# Patient Record
Sex: FEMALE | Race: WHITE | NOT HISPANIC OR LATINO | URBAN - NONMETROPOLITAN AREA
[De-identification: names, ages, dates, MRNs, and addresses within clinical notes are randomized per-mention and may not be internally consistent; named-entity substitution may affect disease eponyms.]

---

## 2021-04-08 DIAGNOSIS — Z23 ENCOUNTER FOR IMMUNIZATION: ICD-10-CM

## 2024-05-20 ENCOUNTER — CONSULT (OUTPATIENT)
Dept: PAIN MEDICINE | Facility: CLINIC | Age: 74
End: 2024-05-20

## 2024-05-20 VITALS
TEMPERATURE: 97.5 F | WEIGHT: 160 LBS | SYSTOLIC BLOOD PRESSURE: 138 MMHG | HEART RATE: 70 BPM | BODY MASS INDEX: 25.71 KG/M2 | HEIGHT: 66 IN | DIASTOLIC BLOOD PRESSURE: 86 MMHG | OXYGEN SATURATION: 98 % | RESPIRATION RATE: 18 BRPM

## 2024-05-20 DIAGNOSIS — M47.812 CERVICAL SPONDYLOSIS: ICD-10-CM

## 2024-05-20 DIAGNOSIS — M54.12 CERVICAL RADICULOPATHY: Primary | ICD-10-CM

## 2024-05-20 PROCEDURE — 99204 OFFICE O/P NEW MOD 45 MIN: CPT | Performed by: ANESTHESIOLOGY

## 2024-05-20 PROCEDURE — G2211 COMPLEX E/M VISIT ADD ON: HCPCS | Performed by: ANESTHESIOLOGY

## 2024-05-20 RX ORDER — PREDNISONE 10 MG/1
10 TABLET ORAL DAILY
COMMUNITY
Start: 2024-03-22 | End: 2024-05-20

## 2024-05-20 RX ORDER — ACETAMINOPHEN 325 MG/1
650 TABLET ORAL EVERY 6 HOURS PRN
COMMUNITY

## 2024-05-20 RX ORDER — CAL/D3/MAG11/ZINC/COP/MANG/BOR 600 MG-800
TABLET ORAL
COMMUNITY
Start: 2011-11-09

## 2024-05-20 RX ORDER — PRAVASTATIN SODIUM 80 MG/1
1 TABLET ORAL EVERY EVENING
COMMUNITY
Start: 2013-12-04

## 2024-05-20 RX ORDER — DEXLANSOPRAZOLE 60 MG/1
60 CAPSULE, DELAYED RELEASE ORAL DAILY
COMMUNITY
Start: 2019-10-01

## 2024-05-20 RX ORDER — GABAPENTIN 300 MG/1
300 CAPSULE ORAL 3 TIMES DAILY
Qty: 90 CAPSULE | Refills: 2 | Status: SHIPPED | OUTPATIENT
Start: 2024-05-20

## 2024-05-20 RX ORDER — DIAZEPAM 5 MG/1
TABLET ORAL
COMMUNITY
Start: 2024-04-23 | End: 2024-05-20

## 2024-05-20 RX ORDER — IBUPROFEN 600 MG/1
600 TABLET ORAL 3 TIMES DAILY
COMMUNITY
Start: 2024-03-22 | End: 2025-03-22

## 2024-05-20 NOTE — PROGRESS NOTES
Assessment  1. Cervical radiculopathy  2. Cervical spondylosis    Left, greater than right sided cervical radicular pain in C6 and C7 dermatomal distribution accompanied by pain limited weakness numbness and paresthesias.  Patient has not yet been a full participant with PT. Chronic pain with decreased participation with IADLs over the past 3 months.  Has been taking NSAIDs and tylenol infrequently with modest benefit.  5/5 strength bilaterally in upper extremities with AROM, posittive spurling's maneuver,b/l.  Additionally there is positive cervical facet loading eliciting pain, left greater than right. Negative Davila's, denies any gait instability, saddle anesthesia. On MRI imaging Multilevel disc degeneration and cervical spondylosis, greatest at C3-4.  At C3-4, disc ridge complex with uncovertebral arthropathy. There is mildimpression of the ventral thecal sac with moderate bilateral neural foraminal stenosis.  No cord compression or cord signal abnormality.  Risks, benefits alternatives to epidural steroid injections thoroughly discussed with patient.  Handouts provided questions answered to patient's satisfaction.  Lifestyle modifications extensively discussed including sleep hygiene, neck posture, diet, exercise and weight loss in conjunction with PT.  Will proceed with multimodal pain therapy plan as noted below:    Plan  -C6-C7 ILESI; f/u 2 weeks post procedure  -gabapentin 300 mg t.i.d. Ordered for patient; counseled regarding sedative effects of taking this medication and provided up titration calendar.  Counseled not to take medication while driving or operating heavy machinery/using stairs  -script provided for formal physical therapy for right-sided lumbar radiculopathy; Physician directed home exercise plan as per [employer] demonstrated and handouts provided that patient plans to participate with for 1 hour, twice a week for the next 6 weeks.     There are risks associated with opioid medications,  including dependence, addiction and tolerance. The patient understands and agrees to use these medications only as prescribed. Potential side effects of the medications include, but are not limited to, constipation, drowsiness, addiction, impaired judgment and risk of fatal overdose if not taken as prescribed. The patient was warned against driving while taking sedation medications or operating heavy machinery. The patient voiced understanding. Sharing medications is a felony. At this point in time, the patient is showing no signs of addiction, abuse, diversion or suicidal ideation.     [place] Prescription Drug Monitoring Program report was reviewed and was appropriate      Complete risks and benefits including bleeding, infection, tissue reaction, nerve injury and allergic reaction were discussed. The approach was demonstrated using models and literature was provided. Verbal and written consent was obtained.     My impressions and treatment recommendations were discussed in detail with the patient who verbalized understanding and had no further questions.  Discharge instructions were provided. I personally saw and examined the patient and I agree with the above discussed plan of care.    Review of external notes including PT notes, PCP notes and specialist notes was performed at this visit in addition to review of new ordered imaging and past imaging to develop or modify multidisciplinary pain plan    New Medications Ordered This Visit   Medications    Calcium Carbonate-Vit D-Min (Caltrate 600+D Plus Minerals) 600-800 MG-UNIT TABS    dexlansoprazole (DEXILANT) 60 MG capsule     Sig: Take 60 mg by mouth daily    diazepam (VALIUM) 5 mg tablet     Sig: One or two pills by mouth before MRI    ibuprofen (MOTRIN) 600 mg tablet     Sig: Take 600 mg by mouth Three times a day    levothyroxine (Synthroid) 25 mcg/mL SUSP    pravastatin (PRAVACHOL) 80 mg tablet     Sig: Take 1 tablet by mouth every evening    predniSONE  10 mg tablet     Sig: Take 10 mg by mouth daily    acetaminophen (TYLENOL) 325 mg tablet     Sig: Take 650 mg by mouth every 6 (six) hours as needed for mild pain       History of Present Illness    Caitie Watson is a 73 y.o. female with pmhx of anxiety, XOL, hypothyroidism, GERD presenting with left, greater than right sided cervical radicular pain in C6 and C7 dermatomal distribution accompanied by pain limited weakness numbness and paresthesias.  Patient has not yet been been a full participant with PT. Chronic pain with decreased participation with IADLs over the past few months  Has been taking NSAIDs and tylenol infrequently with modest benefit.  Steroids orally helped significantly. The pain contributes to significant disability in participation with independent activities of daily living and is accompanied by weakness numbness and paresthesias that are debilitating in nature.  The patient describes that overhead maneuvers such as combing hair is significantly limiting with respect to strength in the left vs right arm/hand. The patient notes significant weakness with bilateral hand  as well. The patient has not been to physical therapy yet. She has trialed conservative measures including nsaids, tylenol for the pain but has had the most benefit with steroids.  She has never had interventional pain procedures in the past including any cervical interlaminar epidural steroid injections in the past for this pain. Denies any gait abnormality, saddle anesthesia or bowel/bladder abnormality.    I have personally reviewed and/or updated the patient's past medical history, past surgical history, family history, social history, current medications, allergies, and vital signs today.     Review of Systems   Constitutional:  Positive for activity change.   HENT: Negative.     Eyes: Negative.    Respiratory: Negative.     Cardiovascular: Negative.    Gastrointestinal: Negative.    Endocrine: Negative.    Genitourinary:  Negative.    Musculoskeletal:  Positive for arthralgias, myalgias, neck pain and neck stiffness. Negative for back pain and gait problem.   Skin: Negative.    Allergic/Immunologic: Negative.    Neurological:  Positive for weakness and numbness.   Hematological: Negative.    Psychiatric/Behavioral: Negative.     All other systems reviewed and are negative.      Patient Active Problem List   Diagnosis    Cervical radiculopathy    Cervical spondylosis       Past Medical History:   Diagnosis Date    Breast cancer (HCC)     left breast had 18 radiation treatments    Cancer (HCC)     GERD (gastroesophageal reflux disease)     Hyperlipidemia        Past Surgical History:   Procedure Laterality Date    BREAST LUMPECTOMY Left 01/2022    HYSTERECTOMY  1996    LAPAROSCOPIC CHOLECYSTECTOMY  02/01/2023       History reviewed. No pertinent family history.    Social History     Occupational History    Not on file   Tobacco Use    Smoking status: Never    Smokeless tobacco: Never   Vaping Use    Vaping status: Never Used   Substance and Sexual Activity    Alcohol use: Not Currently    Drug use: Never    Sexual activity: Not on file       Current Outpatient Medications on File Prior to Visit   Medication Sig    acetaminophen (TYLENOL) 325 mg tablet Take 650 mg by mouth every 6 (six) hours as needed for mild pain    Calcium Carbonate-Vit D-Min (Caltrate 600+D Plus Minerals) 600-800 MG-UNIT TABS     dexlansoprazole (DEXILANT) 60 MG capsule Take 60 mg by mouth daily    levothyroxine (Synthroid) 25 mcg/mL SUSP     pravastatin (PRAVACHOL) 80 mg tablet Take 1 tablet by mouth every evening    diazepam (VALIUM) 5 mg tablet One or two pills by mouth before MRI (Patient not taking: Reported on 5/20/2024)    ibuprofen (MOTRIN) 600 mg tablet Take 600 mg by mouth Three times a day (Patient not taking: Reported on 5/20/2024)    predniSONE 10 mg tablet Take 10 mg by mouth daily (Patient not taking: Reported on 5/20/2024)     No current  "facility-administered medications on file prior to visit.       No Known Allergies      Physical Exam    /86 (BP Location: Right arm, Patient Position: Sitting, Cuff Size: Adult)   Pulse 70   Temp 97.5 °F (36.4 °C)   Resp 18   Ht 5' 6\" (1.676 m)   Wt 72.6 kg (160 lb)   SpO2 98%   BMI 25.82 kg/m²     Constitutional: normal, well developed, well nourished, alert, in no distress and non-toxic and no overt pain behavior.  Eyes: anicteric  HEENT: grossly intact  Neck: supple, symmetric, trachea midline and no masses   Pulmonary:even and unlabored  Cardiovascular:No edema or pitting edema present  Skin:Normal without rashes or lesions and well hydrated  Psychiatric:Mood and affect appropriate  Neurologic:Cranial Nerves II-XII grossly intact Sensation grossly intact; no clonus negative smith's. Reflexes 2+ and brisk.  Musculoskeletal:normal gait. 5/5 strength bilaterally with AROM in upper extremities. Significant pain with cervical facet loading bilaterally and with lateral spine rotation, ttp over cervical paraspinal muscles, left greater than right.     Imaging    MRI cervical spine wo contrast  Order: [id number]  Impression    IMPRESSION:    1.  Multilevel disc degeneration and cervical spondylosis, greatest at C3-4.  2.  At C3-4, disc ridge complex with uncovertebral arthropathy. There is mild  impression of the ventral thecal sac with moderate bilateral neural foraminal  stenosis.            Workstation:[id number]  Narrative      Examination: MRI cervical spine    INDICATION: Chronic neck pain    TECHNIQUE: Multiplanar multi sequential imaging performed of the cervical spine,  without contrast    FINDINGS: There is straightening of the cervical lordosis. Vertebral bodies are  preserved in stature. No pathologic edema noted within the marrow or within the  ligaments. The facet joints are aligned normally. There is anterior endplate  spurring at C4, C4-5, C5-6, C6-7.    Evaluation of individual disc levels " demonstrate the following:    C2-3, no evidence of disc herniation, central canal, neural foraminal stenosis.    C3-4, disc ridge complex uncovertebral arthropathy. Is mild impression of the  ventral thecal sac with moderate bilateral neural foraminal stenosis.    C4-5, mild degenerative disc bulging. Is mild impression of the ventral thecal  sac with mild bilateral neural foraminal stenosis.    C5-6, disc ridge complex uncovertebral arthropathy. Mild impression of the  ventral thecal sac with mild bilateral neural foraminal narrowing.    C6-7, degenerative disc bulging. Mild impression of the ventral thecal sac with  mild bilateral neural foraminal narrowing.    C7-T1, normal.

## 2024-05-20 NOTE — PATIENT INSTRUCTIONS
Neck Exercises   AMBULATORY CARE:   Neck exercises  help reduce neck pain, and improve neck movement and strength. Neck exercises also help prevent long-term neck problems.  Call your doctor if:   Your pain does not get better, or gets worse.    You have questions or concerns about your condition, care, or exercise program.    What you need to know about exercise safety:   Move slowly, gently, and smoothly.  Avoid fast or jerky motions.    Stand and sit the way your healthcare provider shows you.  Good posture may reduce your neck pain. Check your posture often, even when you are not doing your neck exercises.    Follow the exercise program recommended by your healthcare provider.  He or she will tell you which exercises are best for your condition. He or she will also tell you how many repetitions to do and how often you should do the exercises.    How to perform neck exercises safely:   Exercise position:  You may sit or stand while you do neck exercises. Face forward. Your shoulders should be straight and relaxed, with a good posture.         Head tilts, forward and back:  Gently bow your head and try to touch your chin to your chest. Your healthcare provider may tell you to push on the back of your neck to help bow your head. Raise your chin back to the starting position. Tilt your head back as far as possible so you are looking up at the ceiling. Your healthcare provider may tell you to lift your chin to help tilt your head back. Return your head to the starting position.         Head tilts, side to side:  Tilt your head, bringing your ear toward your shoulder. Then tilt your head toward the other shoulder.         Head turns:  Turn your head to look over your shoulder. Tilt your chin down and try to touch it to your shoulder. Do not raise your shoulder to your chin. Face forward again. Do the same on the other side.         Head rolls:  Slowly bring your chin toward your chest. Next, roll your head to the  right. Your ear should be positioned over your shoulder. Hold this position for 5 seconds. Roll your head back toward your chest and to the left into the same position. Hold for 5 seconds. Gently roll your head back and around in a clockwise [place] 3 times. Next, move your head in the reverse direction (counterclockwise) in a [place] 3 times. Do not shrug your shoulders upwards while you do this exercise.       Follow up with your doctor as directed:  Write down your questions so you remember to ask them during your visits.  © Copyright [employer] 2023 Information is for End User's use only and may not be sold, redistributed or otherwise used for commercial purposes.  The above information is an  only. It is not intended as medical advice for individual conditions or treatments. Talk to your doctor, nurse or pharmacist before following any medical regimen to see if it is safe and effective for you.

## 2024-05-20 NOTE — H&P (VIEW-ONLY)
Assessment  1. Cervical radiculopathy  2. Cervical spondylosis    Left, greater than right sided cervical radicular pain in C6 and C7 dermatomal distribution accompanied by pain limited weakness numbness and paresthesias.  Patient has not yet been a full participant with PT. Chronic pain with decreased participation with IADLs over the past 3 months.  Has been taking NSAIDs and tylenol infrequently with modest benefit.  5/5 strength bilaterally in upper extremities with AROM, posittive spurling's maneuver,b/l.  Additionally there is positive cervical facet loading eliciting pain, left greater than right. Negative Davila's, denies any gait instability, saddle anesthesia. On MRI imaging Multilevel disc degeneration and cervical spondylosis, greatest at C3-4.  At C3-4, disc ridge complex with uncovertebral arthropathy. There is mildimpression of the ventral thecal sac with moderate bilateral neural foraminal stenosis.  No cord compression or cord signal abnormality.  Risks, benefits alternatives to epidural steroid injections thoroughly discussed with patient.  Handouts provided questions answered to patient's satisfaction.  Lifestyle modifications extensively discussed including sleep hygiene, neck posture, diet, exercise and weight loss in conjunction with PT.  Will proceed with multimodal pain therapy plan as noted below:    Plan  -C6-C7 ILESI; f/u 2 weeks post procedure  -gabapentin 300 mg t.i.d. Ordered for patient; counseled regarding sedative effects of taking this medication and provided up titration calendar.  Counseled not to take medication while driving or operating heavy machinery/using stairs  -script provided for formal physical therapy for right-sided lumbar radiculopathy; Physician directed home exercise plan as per [employer] demonstrated and handouts provided that patient plans to participate with for 1 hour, twice a week for the next 6 weeks.     There are risks associated with opioid medications,  including dependence, addiction and tolerance. The patient understands and agrees to use these medications only as prescribed. Potential side effects of the medications include, but are not limited to, constipation, drowsiness, addiction, impaired judgment and risk of fatal overdose if not taken as prescribed. The patient was warned against driving while taking sedation medications or operating heavy machinery. The patient voiced understanding. Sharing medications is a felony. At this point in time, the patient is showing no signs of addiction, abuse, diversion or suicidal ideation.     [place] Prescription Drug Monitoring Program report was reviewed and was appropriate      Complete risks and benefits including bleeding, infection, tissue reaction, nerve injury and allergic reaction were discussed. The approach was demonstrated using models and literature was provided. Verbal and written consent was obtained.     My impressions and treatment recommendations were discussed in detail with the patient who verbalized understanding and had no further questions.  Discharge instructions were provided. I personally saw and examined the patient and I agree with the above discussed plan of care.    Review of external notes including PT notes, PCP notes and specialist notes was performed at this visit in addition to review of new ordered imaging and past imaging to develop or modify multidisciplinary pain plan    New Medications Ordered This Visit   Medications    Calcium Carbonate-Vit D-Min (Caltrate 600+D Plus Minerals) 600-800 MG-UNIT TABS    dexlansoprazole (DEXILANT) 60 MG capsule     Sig: Take 60 mg by mouth daily    diazepam (VALIUM) 5 mg tablet     Sig: One or two pills by mouth before MRI    ibuprofen (MOTRIN) 600 mg tablet     Sig: Take 600 mg by mouth Three times a day    levothyroxine (Synthroid) 25 mcg/mL SUSP    pravastatin (PRAVACHOL) 80 mg tablet     Sig: Take 1 tablet by mouth every evening    predniSONE  10 mg tablet     Sig: Take 10 mg by mouth daily    acetaminophen (TYLENOL) 325 mg tablet     Sig: Take 650 mg by mouth every 6 (six) hours as needed for mild pain       History of Present Illness    Caitie Watson is a 73 y.o. female with pmhx of anxiety, XOL, hypothyroidism, GERD presenting with left, greater than right sided cervical radicular pain in C6 and C7 dermatomal distribution accompanied by pain limited weakness numbness and paresthesias.  Patient has not yet been been a full participant with PT. Chronic pain with decreased participation with IADLs over the past few months  Has been taking NSAIDs and tylenol infrequently with modest benefit.  Steroids orally helped significantly. The pain contributes to significant disability in participation with independent activities of daily living and is accompanied by weakness numbness and paresthesias that are debilitating in nature.  The patient describes that overhead maneuvers such as combing hair is significantly limiting with respect to strength in the left vs right arm/hand. The patient notes significant weakness with bilateral hand  as well. The patient has not been to physical therapy yet. She has trialed conservative measures including nsaids, tylenol for the pain but has had the most benefit with steroids.  She has never had interventional pain procedures in the past including any cervical interlaminar epidural steroid injections in the past for this pain. Denies any gait abnormality, saddle anesthesia or bowel/bladder abnormality.    I have personally reviewed and/or updated the patient's past medical history, past surgical history, family history, social history, current medications, allergies, and vital signs today.     Review of Systems   Constitutional:  Positive for activity change.   HENT: Negative.     Eyes: Negative.    Respiratory: Negative.     Cardiovascular: Negative.    Gastrointestinal: Negative.    Endocrine: Negative.    Genitourinary:  Negative.    Musculoskeletal:  Positive for arthralgias, myalgias, neck pain and neck stiffness. Negative for back pain and gait problem.   Skin: Negative.    Allergic/Immunologic: Negative.    Neurological:  Positive for weakness and numbness.   Hematological: Negative.    Psychiatric/Behavioral: Negative.     All other systems reviewed and are negative.      Patient Active Problem List   Diagnosis    Cervical radiculopathy    Cervical spondylosis       Past Medical History:   Diagnosis Date    Breast cancer (HCC)     left breast had 18 radiation treatments    Cancer (HCC)     GERD (gastroesophageal reflux disease)     Hyperlipidemia        Past Surgical History:   Procedure Laterality Date    BREAST LUMPECTOMY Left 01/2022    HYSTERECTOMY  1996    LAPAROSCOPIC CHOLECYSTECTOMY  02/01/2023       History reviewed. No pertinent family history.    Social History     Occupational History    Not on file   Tobacco Use    Smoking status: Never    Smokeless tobacco: Never   Vaping Use    Vaping status: Never Used   Substance and Sexual Activity    Alcohol use: Not Currently    Drug use: Never    Sexual activity: Not on file       Current Outpatient Medications on File Prior to Visit   Medication Sig    acetaminophen (TYLENOL) 325 mg tablet Take 650 mg by mouth every 6 (six) hours as needed for mild pain    Calcium Carbonate-Vit D-Min (Caltrate 600+D Plus Minerals) 600-800 MG-UNIT TABS     dexlansoprazole (DEXILANT) 60 MG capsule Take 60 mg by mouth daily    levothyroxine (Synthroid) 25 mcg/mL SUSP     pravastatin (PRAVACHOL) 80 mg tablet Take 1 tablet by mouth every evening    diazepam (VALIUM) 5 mg tablet One or two pills by mouth before MRI (Patient not taking: Reported on 5/20/2024)    ibuprofen (MOTRIN) 600 mg tablet Take 600 mg by mouth Three times a day (Patient not taking: Reported on 5/20/2024)    predniSONE 10 mg tablet Take 10 mg by mouth daily (Patient not taking: Reported on 5/20/2024)     No current  "facility-administered medications on file prior to visit.       No Known Allergies      Physical Exam    /86 (BP Location: Right arm, Patient Position: Sitting, Cuff Size: Adult)   Pulse 70   Temp 97.5 °F (36.4 °C)   Resp 18   Ht 5' 6\" (1.676 m)   Wt 72.6 kg (160 lb)   SpO2 98%   BMI 25.82 kg/m²     Constitutional: normal, well developed, well nourished, alert, in no distress and non-toxic and no overt pain behavior.  Eyes: anicteric  HEENT: grossly intact  Neck: supple, symmetric, trachea midline and no masses   Pulmonary:even and unlabored  Cardiovascular:No edema or pitting edema present  Skin:Normal without rashes or lesions and well hydrated  Psychiatric:Mood and affect appropriate  Neurologic:Cranial Nerves II-XII grossly intact Sensation grossly intact; no clonus negative smith's. Reflexes 2+ and brisk.  Musculoskeletal:normal gait. 5/5 strength bilaterally with AROM in upper extremities. Significant pain with cervical facet loading bilaterally and with lateral spine rotation, ttp over cervical paraspinal muscles, left greater than right.     Imaging    MRI cervical spine wo contrast  Order: [id number]  Impression    IMPRESSION:    1.  Multilevel disc degeneration and cervical spondylosis, greatest at C3-4.  2.  At C3-4, disc ridge complex with uncovertebral arthropathy. There is mild  impression of the ventral thecal sac with moderate bilateral neural foraminal  stenosis.            Workstation:[id number]  Narrative      Examination: MRI cervical spine    INDICATION: Chronic neck pain    TECHNIQUE: Multiplanar multi sequential imaging performed of the cervical spine,  without contrast    FINDINGS: There is straightening of the cervical lordosis. Vertebral bodies are  preserved in stature. No pathologic edema noted within the marrow or within the  ligaments. The facet joints are aligned normally. There is anterior endplate  spurring at C4, C4-5, C5-6, C6-7.    Evaluation of individual disc levels " demonstrate the following:    C2-3, no evidence of disc herniation, central canal, neural foraminal stenosis.    C3-4, disc ridge complex uncovertebral arthropathy. Is mild impression of the  ventral thecal sac with moderate bilateral neural foraminal stenosis.    C4-5, mild degenerative disc bulging. Is mild impression of the ventral thecal  sac with mild bilateral neural foraminal stenosis.    C5-6, disc ridge complex uncovertebral arthropathy. Mild impression of the  ventral thecal sac with mild bilateral neural foraminal narrowing.    C6-7, degenerative disc bulging. Mild impression of the ventral thecal sac with  mild bilateral neural foraminal narrowing.    C7-T1, normal.

## 2024-05-21 ENCOUNTER — PREP FOR PROCEDURE (OUTPATIENT)
Dept: PAIN MEDICINE | Facility: CLINIC | Age: 74
End: 2024-05-21

## 2024-05-21 DIAGNOSIS — M47.812 CERVICAL SPONDYLOSIS: Primary | ICD-10-CM

## 2024-05-21 DIAGNOSIS — M54.12 CERVICAL RADICULOPATHY: ICD-10-CM

## 2024-05-22 ENCOUNTER — HOSPITAL ENCOUNTER (OUTPATIENT)
Dept: GASTROENTEROLOGY | Facility: HOSPITAL | Age: 74
Setting detail: OUTPATIENT SURGERY
Discharge: HOME | End: 2024-05-22
Attending: ANESTHESIOLOGY | Admitting: ANESTHESIOLOGY
Payer: MEDICARE

## 2024-05-22 VITALS
RESPIRATION RATE: 18 BRPM | HEIGHT: 66 IN | SYSTOLIC BLOOD PRESSURE: 117 MMHG | HEART RATE: 61 BPM | BODY MASS INDEX: 25.71 KG/M2 | WEIGHT: 160 LBS | OXYGEN SATURATION: 98 % | DIASTOLIC BLOOD PRESSURE: 60 MMHG | TEMPERATURE: 97 F

## 2024-05-22 DIAGNOSIS — M54.12 CERVICAL RADICULOPATHY: ICD-10-CM

## 2024-05-22 DIAGNOSIS — M47.812 CERVICAL SPONDYLOSIS: ICD-10-CM

## 2024-05-22 PROCEDURE — 62321 NJX INTERLAMINAR CRV/THRC: CPT | Performed by: ANESTHESIOLOGY

## 2024-05-22 RX ORDER — SODIUM CHLORIDE 9 MG/ML
INJECTION INTRAVENOUS AS NEEDED
Status: COMPLETED | OUTPATIENT
Start: 2024-05-22 | End: 2024-05-22

## 2024-05-22 RX ORDER — METHYLPREDNISOLONE ACETATE 80 MG/ML
INJECTION, SUSPENSION INTRA-ARTICULAR; INTRALESIONAL; INTRAMUSCULAR; SOFT TISSUE AS NEEDED
Status: COMPLETED | OUTPATIENT
Start: 2024-05-22 | End: 2024-05-22

## 2024-05-22 RX ORDER — LIDOCAINE HYDROCHLORIDE 10 MG/ML
INJECTION, SOLUTION EPIDURAL; INFILTRATION; INTRACAUDAL; PERINEURAL AS NEEDED
Status: COMPLETED | OUTPATIENT
Start: 2024-05-22 | End: 2024-05-22

## 2024-05-22 RX ADMIN — METHYLPREDNISOLONE ACETATE 80 MG: 80 INJECTION, SUSPENSION INTRA-ARTICULAR; INTRALESIONAL; INTRAMUSCULAR; SOFT TISSUE at 11:48

## 2024-05-22 RX ADMIN — SODIUM CHLORIDE 3 ML: 9 INJECTION, SOLUTION INTRAVENOUS at 11:47

## 2024-05-22 RX ADMIN — IOHEXOL 1 ML: 240 INJECTION, SOLUTION INTRATHECAL; INTRAVASCULAR; INTRAVENOUS; ORAL at 11:47

## 2024-05-22 RX ADMIN — LIDOCAINE HYDROCHLORIDE 5 ML: 10 INJECTION, SOLUTION EPIDURAL; INFILTRATION; INTRACAUDAL; PERINEURAL at 11:46

## 2024-05-22 NOTE — INTERVAL H&P NOTE
H&P reviewed. After examining the patient I find no changes in the patients condition since the H&P had been written.    Vitals:    05/22/24 1120   Pulse: 72   Resp: 16   Temp: (!) 97 °F (36.1 °C)   SpO2: 97%

## 2024-05-22 NOTE — DISCHARGE INSTR - AVS FIRST PAGE
YOUR 2 WEEK FOLLOW UP HAS BEEN SCHEDULED; IF YOU WISH TO CHANGE THE FOLLOW UP, PLEASE CALL THE [facility] AT [place]: [phone]    Epidural Steroid Injection   WHAT YOU NEED TO KNOW:   An epidural steroid injection (RICARDO) is a procedure to inject steroid medicine into the epidural space. The epidural space is between your spinal cord and vertebrae. Steroids reduce inflammation and fluid buildup in your spine that may be causing pain. You may be given pain medicine along with the steroids.        DISCHARGE INSTRUCTIONS:   Call your local emergency number (911 in the [place]) if:   You have a seizure.    You have trouble moving your legs.    Seek care immediately if:   Blood soaks through your bandage.    You have a fever or chills, severe back pain, and the procedure area is sensitive to the touch.    You cannot control when you urinate or have a bowel movement.    Call your doctor if:   You have weakness or numbness in your legs.    Your wound is red, swollen, or draining pus.    You have nausea or are vomiting.    Your face or neck is red and you feel warm.    You have more pain than you had before the procedure.    You have swelling in your hands or feet.    You have questions or concerns about your condition or care.    Care for your wound as directed:  You may remove the bandage before you go to bed the day of your procedure. You may take a shower, but do not take a bath for at least 24 hours.   Self-care:   Do not drive,  use machines, or do strenuous activity for 24 hours after your procedure or as directed.     Continue other treatments  as directed. Steroid injections alone will not control your pain. The injections are meant to be used with other treatments, such as physical therapy.    Follow up with your doctor as directed:  Write down your questions so you remember to ask them during your visits.     EPIDURAL STEROID INJECTION DISCHARGE INSTRUCTIONS      ACTIVITY  Do not drive or operate  machinery today.  No strenuous activity today - bending, lifting, etc.   You may resume normal activities starting tomorrow - start slowly and as tolerated.  You may shower today, but not tub baths or hot tubs.  You may have numbness for several hours from the local anesthetics. Please use caution and common sense, especially with weight-bearing activities.    CARE OF THE INJECTION SITE  If you have soreness or pain apply ice to the area today (20 minutes on and 20 minutes off).  Starting tomorrow, you   Notify the [facility] if you have any of the following: redness, drainage, swelling or fever above 100°F.      MEDICATIONS  Continue to take all routine medications.  Our office may have instructed you to hold some medications. You may restart medications, including blood thinners.

## 2024-05-29 ENCOUNTER — TELEPHONE (OUTPATIENT)
Dept: OBGYN CLINIC | Facility: HOSPITAL | Age: 74
End: 2024-05-29

## 2024-06-21 ENCOUNTER — OFFICE VISIT (OUTPATIENT)
Dept: PAIN MEDICINE | Facility: CLINIC | Age: 74
End: 2024-06-21
Payer: MEDICARE

## 2024-06-21 VITALS
HEIGHT: 66 IN | HEART RATE: 66 BPM | OXYGEN SATURATION: 99 % | RESPIRATION RATE: 18 BRPM | BODY MASS INDEX: 25.71 KG/M2 | WEIGHT: 160 LBS | TEMPERATURE: 97.7 F | DIASTOLIC BLOOD PRESSURE: 70 MMHG | SYSTOLIC BLOOD PRESSURE: 118 MMHG

## 2024-06-21 DIAGNOSIS — M54.12 CERVICAL RADICULOPATHY: Primary | ICD-10-CM

## 2024-06-21 PROCEDURE — G2211 COMPLEX E/M VISIT ADD ON: HCPCS | Performed by: ANESTHESIOLOGY

## 2024-06-21 PROCEDURE — 99213 OFFICE O/P EST LOW 20 MIN: CPT | Performed by: ANESTHESIOLOGY

## 2024-06-21 NOTE — PATIENT INSTRUCTIONS
Diclofenac (On the skin)   Diclofenac (dye-KLOE-fen-ak)  Treats actinic keratoses. Also treats pain and swelling caused by arthritis, including osteoarthritis of the knee. This is an NSAID.   Brand Name(s): Aspercreme Arthritis Pain Reliever, Diclo Gel, Diclofono, Diclozor, [facility] Arthritis Pain, Good Sense Arthritis Pain, Klofensaid II, Leader Arthritis Pain Reliever, Lexixryl, Motrin Arthritis Pain, Pennsaid, Profinac, Solaravix, Solaraze, Sunmark Arthritis Pain   There may be other brand names for this medicine.  When This Medicine Should Not Be Used:   This medicine is not right for everyone. Do not use it if you had an allergic reaction to diclofenac, aspirin or another NSAID medication.  How to Use This Medicine:   Gel/Jelly, Liquid  Use your medicine as directed. There are several brands of this medicine. Make sure you understand how to use the brand you have been prescribed. Ask your doctor if you have any questions.  The diclofenac 1% gel comes with a dosing card to measure the correct dose. If you do not receive or misplace your dosing card, call your pharmacist to ask for a new one.  Voltaren® gel: Follow the instructions on the medicine label if you are using this medicine without a prescription.  Use this medicine only on your skin. Rinse it off right away if it gets on a cut or scrape. Do not get the medicine in your eyes, nose, or mouth.  Wash your hands with soap and water before and after you use this medicine.  Apply a thin layer of the medicine to the affected area. Rub it in gently.  Do not shower, bathe, or wash the affected area for at least 30 minutes after you apply Pennsaid® or Solaraze® or 1 hour after you apply Voltaren®.  Wait until the medicine dries before you cover the treated skin with gloves or clothing. Do not let the treated skin touch any other person's skin until the medicine is completely dry.  Do not use external heat or bandages on the treated skin or  joint.  This medicine should come with a Medication Guide. Ask your pharmacist for a copy if you do not have one.  Missed dose: Apply a dose as soon as you can. If it is almost time for your next dose, wait until then and apply a regular dose. Do not apply extra medicine to make up for a missed dose.  Store the medicine in a closed container at room temperature, away from heat, moisture, and direct light.  Drugs and Foods to Avoid:   Ask your doctor or pharmacist before using any other medicine, including over-the-counter medicines, vitamins, and herbal products.  Do not use any other NSAID unless your doctor says it is okay. Some other NSAIDs are aspirin, diflunisal, ibuprofen, naproxen, or salsalate.  Some medicines can affect how diclofenac works. Tell your doctor if you are using any of the following:  Acetaminophen, cyclosporine, digoxin, lithium, methotrexate, pemetrexed  Blood pressure medicine (including ACE inhibitors, ARBs, beta blockers)  Blood thinner (including warfarin)  Diuretic (water pill)  Medicine to treat depression (including SNRIs, SSRIs)  Steroids (including dexamethasone, hydrocortisone, methylprednisolone, prednisolone, prednisone)  Do not put cosmetics or skin care products on the treated skin. You may use sunscreen, insect repellant, lotion, or other topical medicines after using Pennsaid®. However, wait until the medicine is completely dry before you apply anything else.  Warnings While Using This Medicine:   Tell your doctor if you are pregnant. Do not use this medicine during the later part of a pregnancy, unless your doctor tells you to.  Tell your doctor if you are breastfeeding, or if you have kidney disease, liver disease, asthma, bleeding problems, heart failure, high blood pressure, other heart or blood vessel problems, a recent heart attack, or a history of stomach ulcers or bleeding. Tell your doctor if you drink alcohol.  This medicine may cause the following problems:  Higher  risk of blood clot, heart attack, heart failure, or stroke  Stomach or bowel problems (including bleeding, ulcers, or perforation)  Liver problems  High blood pressure  Kidney problems  Serious skin reactions, including Red-Sung syndrome, exfoliative dermatitis, toxic epidermal necrolysis, and drug reaction with eosinophilia and systemic symptoms (DRESS)  This medicine may cause a delay in ovulation for women and may affect their ability to have children. If you plan to have children, talk with your doctor before using this medicine.  This medicine may make your skin more sensitive to sunlight. Wear sunscreen. Do not use sunlamps or tanning beds.  Your doctor will do lab tests at regular visits to check on the effects of this medicine. Keep all appointments.  Keep all medicine out of the reach of children. Never share your medicine with anyone.  Possible Side Effects While Using This Medicine:   Call your doctor right away if you notice any of these side effects:  Allergic reaction: Itching or hives, swelling in your face or hands, swelling or tingling in your mouth or throat, chest tightness, trouble breathing  Blistering, peeling, or red skin rash  Bloody or black, tarry stools, severe stomach pain, vomiting blood or material that looks like coffee grounds  Change in how much or how often you urinate  Chest pain that may spread to your arms, jaw, back, or neck, trouble breathing, unusual sweating, faintness  Dark urine or pale stools, nausea, vomiting, loss of appetite, stomach pain, yellow skin or eyes  Numbness or weakness in your arm or leg, or on one side of your body, pain in your lower leg, sudden or severe headache, or problems with vision, speech, or walking  Rapid weight gain, swelling in your hands, ankles, or feet  Unusual bleeding, bruising, or weakness  If you notice these less serious side effects, talk with your doctor:   Dry, flaky, or scaly skin  Mild headache  Mild skin rash, itching, or  redness  If you notice other side effects that you think are caused by this medicine, tell your doctor.   Call your doctor for medical advice about side effects. You may report side effects to FDA at 1-444-VXY-6482  © Copyright Merative 2023 Information is for End User's use only and may not be sold, redistributed or otherwise used for commercial purposes.  The above information is an  only. It is not intended as medical advice for individual conditions or treatments. Talk to your doctor, nurse or pharmacist before following any medical regimen to see if it is safe and effective for you.  Neck Exercises   AMBULATORY CARE:   Neck exercises  help reduce neck pain, and improve neck movement and strength. Neck exercises also help prevent long-term neck problems.  Call your doctor if:   Your pain does not get better, or gets worse.    You have questions or concerns about your condition, care, or exercise program.    What you need to know about exercise safety:   Move slowly, gently, and smoothly.  Avoid fast or jerky motions.    Stand and sit the way your healthcare provider shows you.  Good posture may reduce your neck pain. Check your posture often, even when you are not doing your neck exercises.    Follow the exercise program recommended by your healthcare provider.  He or she will tell you which exercises are best for your condition. He or she will also tell you how many repetitions to do and how often you should do the exercises.    How to perform neck exercises safely:   Exercise position:  You may sit or stand while you do neck exercises. Face forward. Your shoulders should be straight and relaxed, with a good posture.         Head tilts, forward and back:  Gently bow your head and try to touch your chin to your chest. Your healthcare provider may tell you to push on the back of your neck to help bow your head. Raise your chin back to the starting position. Tilt your head back as far as possible so  you are looking up at the ceiling. Your healthcare provider may tell you to lift your chin to help tilt your head back. Return your head to the starting position.         Head tilts, side to side:  Tilt your head, bringing your ear toward your shoulder. Then tilt your head toward the other shoulder.         Head turns:  Turn your head to look over your shoulder. Tilt your chin down and try to touch it to your shoulder. Do not raise your shoulder to your chin. Face forward again. Do the same on the other side.         Head rolls:  Slowly bring your chin toward your chest. Next, roll your head to the right. Your ear should be positioned over your shoulder. Hold this position for 5 seconds. Roll your head back toward your chest and to the left into the same position. Hold for 5 seconds. Gently roll your head back and around in a clockwise [place] 3 times. Next, move your head in the reverse direction (counterclockwise) in a [place] 3 times. Do not shrug your shoulders upwards while you do this exercise.       Follow up with your doctor as directed:  Write down your questions so you remember to ask them during your visits.  © Copyright Merative 2023 Information is for End User's use only and may not be sold, redistributed or otherwise used for commercial purposes.  The above information is an  only. It is not intended as medical advice for individual conditions or treatments. Talk to your doctor, nurse or pharmacist before following any medical regimen to see if it is safe and effective for you.

## 2024-06-21 NOTE — PROGRESS NOTES
Assessment  1. Cervical radiculopathy    Greater than 70% relief of pain with improved ability to participate with IADLs after C6-C7 ILESI. Gabapentin at current dosing significantly improving pain, participation with IADLs without side effects. PT significantly improving pain. Previously reported the following symptomatology:     Left, greater than right sided cervical radicular pain in C6 and C7 dermatomal distribution accompanied by pain limited weakness numbness and paresthesias.  Patient has not yet been a full participant with PT. Chronic pain with decreased participation with IADLs over the past 3 months.  Has been taking NSAIDs and tylenol infrequently with modest benefit.  5/5 strength bilaterally in upper extremities with AROM, posittive spurling's maneuver,b/l.  Additionally there is positive cervical facet loading eliciting pain, left greater than right. Negative Davila's, denies any gait instability, saddle anesthesia. On MRI imaging Multilevel disc degeneration and cervical spondylosis, greatest at C3-4.  At C3-4, disc ridge complex with uncovertebral arthropathy. There is mildimpression of the ventral thecal sac with moderate bilateral neural foraminal stenosis.  No cord compression or cord signal abnormality.  Risks, benefits alternatives to epidural steroid injections thoroughly discussed with patient.  Handouts provided questions answered to patient's satisfaction.  Lifestyle modifications extensively discussed including sleep hygiene, neck posture, diet, exercise and weight loss in conjunction with PT.  Will proceed with multimodal pain therapy plan as noted below:    Plan  -f/u prn  -gabapentin 300 mg t.i.d. Ordered for patient; counseled regarding sedative effects of taking this medication and provided up titration calendar.  Counseled not to take medication while driving or operating heavy machinery/using stairs  -script provided for formal physical therapy for right-sided lumbar  radiculopathy; Physician directed home exercise plan as per [facility] demonstrated and handouts provided that patient plans to participate with for 1 hour, twice a week for the next 6 weeks.     There are risks associated with opioid medications, including dependence, addiction and tolerance. The patient understands and agrees to use these medications only as prescribed. Potential side effects of the medications include, but are not limited to, constipation, drowsiness, addiction, impaired judgment and risk of fatal overdose if not taken as prescribed. The patient was warned against driving while taking sedation medications or operating heavy machinery. The patient voiced understanding. Sharing medications is a felony. At this point in time, the patient is showing no signs of addiction, abuse, diversion or suicidal ideation.     [place] Prescription Drug Monitoring Program report was reviewed and was appropriate      Complete risks and benefits including bleeding, infection, tissue reaction, nerve injury and allergic reaction were discussed. The approach was demonstrated using models and literature was provided. Verbal and written consent was obtained.     My impressions and treatment recommendations were discussed in detail with the patient who verbalized understanding and had no further questions.  Discharge instructions were provided. I personally saw and examined the patient and I agree with the above discussed plan of care.    Review of external notes including PT notes, PCP notes and specialist notes was performed at this visit in addition to review of new ordered imaging and past imaging to develop or modify multidisciplinary pain plan    No orders of the defined types were placed in this encounter.      History of Present Illness    Greater than 70% relief of pain with improved ability to participate with IADLs after C6-C7 ILESI. Gabapentin at current dosing significantly improving pain, participation with  IADLs without side effects. PT significantly improving pain. Previously reported the following symptomatology:     Caitie Watson is a 73 y.o. female with pmhx of anxiety, XOL, hypothyroidism, GERD presenting with left, greater than right sided cervical radicular pain in C6 and C7 dermatomal distribution accompanied by pain limited weakness numbness and paresthesias.  Patient has not yet been been a full participant with PT. Chronic pain with decreased participation with IADLs over the past few months  Has been taking NSAIDs and tylenol infrequently with modest benefit.  Steroids orally helped significantly. The pain contributes to significant disability in participation with independent activities of daily living and is accompanied by weakness numbness and paresthesias that are debilitating in nature.  The patient describes that overhead maneuvers such as combing hair is significantly limiting with respect to strength in the left vs right arm/hand. The patient notes significant weakness with bilateral hand  as well. The patient has not been to physical therapy yet. She has trialed conservative measures including nsaids, tylenol for the pain but has had the most benefit with steroids.  She has never had interventional pain procedures in the past including any cervical interlaminar epidural steroid injections in the past for this pain. Denies any gait abnormality, saddle anesthesia or bowel/bladder abnormality.    I have personally reviewed and/or updated the patient's past medical history, past surgical history, family history, social history, current medications, allergies, and vital signs today.     Review of Systems   Constitutional:  Positive for activity change.   HENT: Negative.     Eyes: Negative.    Respiratory: Negative.     Cardiovascular: Negative.    Gastrointestinal: Negative.    Endocrine: Negative.    Genitourinary: Negative.    Musculoskeletal:  Positive for arthralgias, myalgias, neck pain and  "neck stiffness. Negative for back pain and gait problem.   Skin: Negative.    Allergic/Immunologic: Negative.    Neurological:  Positive for weakness and numbness.   Hematological: Negative.    Psychiatric/Behavioral: Negative.     All other systems reviewed and are negative.      Patient Active Problem List   Diagnosis    Cervical radiculopathy    Cervical spondylosis       Past Medical History:   Diagnosis Date    Breast cancer (HCC)     left breast had 18 radiation treatments    Cancer (HCC)     GERD (gastroesophageal reflux disease)     Hyperlipidemia        Past Surgical History:   Procedure Laterality Date    BREAST LUMPECTOMY Left 01/2022    HYSTERECTOMY  1996    IR SPINE AND PAIN PROCEDURE  5/22/2024    LAPAROSCOPIC CHOLECYSTECTOMY  02/01/2023       History reviewed. No pertinent family history.    Social History     Occupational History    Not on file   Tobacco Use    Smoking status: Never    Smokeless tobacco: Never   Vaping Use    Vaping status: Never Used   Substance and Sexual Activity    Alcohol use: Not Currently    Drug use: Never    Sexual activity: Not on file       Current Outpatient Medications on File Prior to Visit   Medication Sig    acetaminophen (TYLENOL) 325 mg tablet Take 650 mg by mouth every 6 (six) hours as needed for mild pain    Calcium Carbonate-Vit D-Min (Caltrate 600+D Plus Minerals) 600-800 MG-UNIT TABS     dexlansoprazole (DEXILANT) 60 MG capsule Take 60 mg by mouth daily    gabapentin (NEURONTIN) 300 mg capsule Take 1 capsule (300 mg total) by mouth 3 (three) times a day    levothyroxine (Synthroid) 25 mcg/mL SUSP     pravastatin (PRAVACHOL) 80 mg tablet Take 1 tablet by mouth every evening     No current facility-administered medications on file prior to visit.       No Known Allergies      Physical Exam    /70 (BP Location: Right arm, Patient Position: Sitting, Cuff Size: Adult)   Pulse 66   Temp 97.7 °F (36.5 °C)   Resp 18   Ht 5' 6\" (1.676 m)   Wt 72.6 kg (160 lb) "   SpO2 99%   BMI 25.82 kg/m²     Constitutional: normal, well developed, well nourished, alert, in no distress and non-toxic and no overt pain behavior.  Eyes: anicteric  HEENT: grossly intact  Neck: supple, symmetric, trachea midline and no masses   Pulmonary:even and unlabored  Cardiovascular:No edema or pitting edema present  Skin:Normal without rashes or lesions and well hydrated  Psychiatric:Mood and affect appropriate  Neurologic:Cranial Nerves II-XII grossly intact Sensation grossly intact; no clonus negative smith's. Reflexes 2+ and brisk.  Musculoskeletal:normal gait. 5/5 strength bilaterally with AROM in upper extremities. Significant pain with cervical facet loading bilaterally and with lateral spine rotation, ttp over cervical paraspinal muscles, left greater than right.     Imaging    MRI cervical spine wo contrast  Order: [id number]  Impression    IMPRESSION:    1.  Multilevel disc degeneration and cervical spondylosis, greatest at C3-4.  2.  At C3-4, disc ridge complex with uncovertebral arthropathy. There is mild  impression of the ventral thecal sac with moderate bilateral neural foraminal  stenosis.            Workstation:[id number]  Narrative      Examination: MRI cervical spine    INDICATION: Chronic neck pain    TECHNIQUE: Multiplanar multi sequential imaging performed of the cervical spine,  without contrast    FINDINGS: There is straightening of the cervical lordosis. Vertebral bodies are  preserved in stature. No pathologic edema noted within the marrow or within the  ligaments. The facet joints are aligned normally. There is anterior endplate  spurring at C4, C4-5, C5-6, C6-7.    Evaluation of individual disc levels demonstrate the following:    C2-3, no evidence of disc herniation, central canal, neural foraminal stenosis.    C3-4, disc ridge complex uncovertebral arthropathy. Is mild impression of the  ventral thecal sac with moderate bilateral neural foraminal stenosis.    C4-5, mild  degenerative disc bulging. Is mild impression of the ventral thecal  sac with mild bilateral neural foraminal stenosis.    C5-6, disc ridge complex uncovertebral arthropathy. Mild impression of the  ventral thecal sac with mild bilateral neural foraminal narrowing.    C6-7, degenerative disc bulging. Mild impression of the ventral thecal sac with  mild bilateral neural foraminal narrowing.    C7-T1, normal.

## 2024-07-01 ENCOUNTER — TELEPHONE (OUTPATIENT)
Dept: PAIN MEDICINE | Facility: CLINIC | Age: 74
End: 2024-07-01

## 2024-07-01 DIAGNOSIS — M54.12 CERVICAL RADICULOPATHY: Primary | ICD-10-CM

## 2024-07-01 DIAGNOSIS — M54.12 CERVICAL RADICULOPATHY: ICD-10-CM

## 2024-07-01 DIAGNOSIS — M47.812 CERVICAL SPONDYLOSIS: ICD-10-CM

## 2024-07-01 RX ORDER — GABAPENTIN 600 MG/1
600 TABLET ORAL DAILY
Qty: 90 TABLET | Refills: 3 | Status: SHIPPED | OUTPATIENT
Start: 2024-07-01

## 2024-07-01 RX ORDER — GABAPENTIN 300 MG/1
300 CAPSULE ORAL 3 TIMES DAILY
Qty: 90 CAPSULE | Refills: 2 | Status: SHIPPED | OUTPATIENT
Start: 2024-07-01 | End: 2024-07-01

## 2024-07-02 NOTE — TELEPHONE ENCOUNTER
Left a detailed message on cell as per release of health information, advising pt the same.  C/B # provided for any questions.

## 2024-07-15 ENCOUNTER — TELEPHONE (OUTPATIENT)
Dept: PAIN MEDICINE | Facility: CLINIC | Age: 74
End: 2024-07-15

## 2024-07-15 DIAGNOSIS — M54.12 CERVICAL RADICULOPATHY: ICD-10-CM

## 2024-07-15 RX ORDER — GABAPENTIN 600 MG/1
600 TABLET ORAL 3 TIMES DAILY
Qty: 270 TABLET | Refills: 3 | Status: SHIPPED | OUTPATIENT
Start: 2024-07-15

## 2024-07-15 NOTE — TELEPHONE ENCOUNTER
----- Message from Saida ELIZABETH sent at 7/15/2024  3:39 PM EDT -----  Pt. Stopped at check out she said at last visit  you told her she can get another injection. Do you need to see her in office first?

## 2024-07-16 ENCOUNTER — PREP FOR PROCEDURE (OUTPATIENT)
Dept: PAIN MEDICINE | Facility: CLINIC | Age: 74
End: 2024-07-16

## 2024-07-16 DIAGNOSIS — M54.12 CERVICAL RADICULOPATHY: ICD-10-CM

## 2024-07-16 DIAGNOSIS — M47.812 CERVICAL SPONDYLOSIS: Primary | ICD-10-CM

## 2024-07-16 NOTE — TELEPHONE ENCOUNTER
Spoke to patient and she is scheduled. Patient aware of instructions. No food/drink one hour prior. Wear comfortable clothing. A  is required. Denies blood thinners. Continue all other prescribed medications on day of procedure. Call if prescribed an antibiotic or become sick. Refrain from vaccinations two weeks prior and two weeks after. Patient aware [facility] nurse will call day prior with instructions and report time. Call with questions.

## 2024-07-30 ENCOUNTER — TELEPHONE (OUTPATIENT)
Dept: PAIN MEDICINE | Facility: CLINIC | Age: 74
End: 2024-07-30

## 2024-07-30 DIAGNOSIS — M47.812 CERVICAL SPONDYLOSIS: ICD-10-CM

## 2024-07-30 DIAGNOSIS — M54.12 CERVICAL RADICULOPATHY: Primary | ICD-10-CM

## 2024-07-30 NOTE — TELEPHONE ENCOUNTER
----- Message from Saida ELIZABETH sent at 7/30/2024  2:58 PM EDT -----  Regarding: FW:   Patient was told by [facility] that she needs a new order  ----- Message -----  From: Adrian Peck MD  Sent: 7/30/2024   2:48 PM EDT  To: Saida Fernandez  Subject: RE:                                              Ordered on 5/20/24 just needs to be faxed thanks  ----- Message -----  From: Saida Fernandez  Sent: 7/29/2024   9:31 AM EDT  To: Adrian Peck MD    Patient stopped in office for physical therapy script (See 6/26/24 encounter Could you please resend a script to [facility] in [place] as I was told yesterday they didn't receive any? ) Patient made aware you are away this week.  FAX # [phone]

## 2024-07-30 NOTE — TELEPHONE ENCOUNTER
Faxed  physical therapy script to [facility] received FAX confirmation.  Called patient  LVM that script was faxed.

## 2024-08-13 ENCOUNTER — HOSPITAL ENCOUNTER (OUTPATIENT)
Dept: INTERVENTIONAL RADIOLOGY/VASCULAR | Facility: HOSPITAL | Age: 74
Discharge: HOME | End: 2024-08-13
Attending: ANESTHESIOLOGY | Admitting: ANESTHESIOLOGY
Payer: MEDICARE

## 2024-08-13 VITALS
SYSTOLIC BLOOD PRESSURE: 125 MMHG | TEMPERATURE: 97.8 F | BODY MASS INDEX: 25.71 KG/M2 | RESPIRATION RATE: 16 BRPM | HEIGHT: 66 IN | HEART RATE: 54 BPM | DIASTOLIC BLOOD PRESSURE: 61 MMHG | OXYGEN SATURATION: 98 % | WEIGHT: 160 LBS

## 2024-08-13 DIAGNOSIS — M54.12 CERVICAL RADICULOPATHY: ICD-10-CM

## 2024-08-13 DIAGNOSIS — M47.812 CERVICAL SPONDYLOSIS: ICD-10-CM

## 2024-08-13 PROCEDURE — 62321 NJX INTERLAMINAR CRV/THRC: CPT | Performed by: ANESTHESIOLOGY

## 2024-08-13 RX ORDER — 0.9 % SODIUM CHLORIDE 0.9 %
VIAL (ML) INJECTION AS NEEDED
Status: COMPLETED | OUTPATIENT
Start: 2024-08-13 | End: 2024-08-13

## 2024-08-13 RX ORDER — LIDOCAINE HYDROCHLORIDE 10 MG/ML
INJECTION, SOLUTION EPIDURAL; INFILTRATION; INTRACAUDAL; PERINEURAL AS NEEDED
Status: COMPLETED | OUTPATIENT
Start: 2024-08-13 | End: 2024-08-13

## 2024-08-13 RX ORDER — METHYLPREDNISOLONE ACETATE 80 MG/ML
INJECTION, SUSPENSION INTRA-ARTICULAR; INTRALESIONAL; INTRAMUSCULAR; PARENTERAL; SOFT TISSUE AS NEEDED
Status: COMPLETED | OUTPATIENT
Start: 2024-08-13 | End: 2024-08-13

## 2024-08-13 RX ADMIN — METHYLPREDNISOLONE ACETATE 80 MG: 80 INJECTION, SUSPENSION INTRA-ARTICULAR; INTRALESIONAL; INTRAMUSCULAR; SOFT TISSUE at 10:59

## 2024-08-13 RX ADMIN — SODIUM CHLORIDE 3 ML: 9 INJECTION, SOLUTION INTRAMUSCULAR; INTRAVENOUS; SUBCUTANEOUS at 10:59

## 2024-08-13 RX ADMIN — IOHEXOL 1 ML: 240 INJECTION, SOLUTION INTRATHECAL; INTRAVASCULAR; INTRAVENOUS; ORAL at 10:59

## 2024-08-13 RX ADMIN — LIDOCAINE HYDROCHLORIDE 5 ML: 10 INJECTION, SOLUTION EPIDURAL; INFILTRATION; INTRACAUDAL; PERINEURAL at 10:56

## 2024-08-13 NOTE — H&P
Assessment  1. Cervical spondylosis  -     IR spine and pain procedure; Standing  -     IR spine and pain procedure  2. Cervical radiculopathy  -     IR spine and pain procedure; Standing  -     IR spine and pain procedure    Greater than 70% relief of pain with improved ability to participate with IADLs after C6-C7 ILESI performed 5/22/24; pain has returned after more than 2 months of relief. Gabapentin at current dosing modestly improving pain, participation with IADLs without side effects. PT significantly improving pain. Previously reported the following symptomatology:     Left, greater than right sided cervical radicular pain in C6 and C7 dermatomal distribution accompanied by pain limited weakness numbness and paresthesias.  Patient has not yet been a full participant with PT. Chronic pain with decreased participation with IADLs over the past 3 months.  Has been taking NSAIDs and tylenol infrequently with modest benefit.  5/5 strength bilaterally in upper extremities with AROM, posittive spurling's maneuver,b/l.  Additionally there is positive cervical facet loading eliciting pain, left greater than right. Negative Davila's, denies any gait instability, saddle anesthesia. On MRI imaging Multilevel disc degeneration and cervical spondylosis, greatest at C3-4.  At C3-4, disc ridge complex with uncovertebral arthropathy. There is mildimpression of the ventral thecal sac with moderate bilateral neural foraminal stenosis.  No cord compression or cord signal abnormality.  Risks, benefits alternatives to epidural steroid injections thoroughly discussed with patient.  Handouts provided questions answered to patient's satisfaction.  Lifestyle modifications extensively discussed including sleep hygiene, neck posture, diet, exercise and weight loss in conjunction with PT.  Will proceed with multimodal pain therapy plan as noted below:    Plan  -C6-C7 ILESI; f/u 2 weeks post procedure  -gabapentin increased to 600  mg t.i.d. Ordered for patient; counseled regarding sedative effects of taking this medication and provided up titration calendar.  Counseled not to take medication while driving or operating heavy machinery/using stairs  -script provided for formal physical therapy for right-sided lumbar radiculopathy; Physician directed home exercise plan as per [employer] demonstrated and handouts provided that patient plans to participate with for 1 hour, twice a week for the next 6 weeks.     There are risks associated with opioid medications, including dependence, addiction and tolerance. The patient understands and agrees to use these medications only as prescribed. Potential side effects of the medications include, but are not limited to, constipation, drowsiness, addiction, impaired judgment and risk of fatal overdose if not taken as prescribed. The patient was warned against driving while taking sedation medications or operating heavy machinery. The patient voiced understanding. Sharing medications is a felony. At this point in time, the patient is showing no signs of addiction, abuse, diversion or suicidal ideation.     [place] Prescription Drug Monitoring Program report was reviewed and was appropriate      Complete risks and benefits including bleeding, infection, tissue reaction, nerve injury and allergic reaction were discussed. The approach was demonstrated using models and literature was provided. Verbal and written consent was obtained.     My impressions and treatment recommendations were discussed in detail with the patient who verbalized understanding and had no further questions.  Discharge instructions were provided. I personally saw and examined the patient and I agree with the above discussed plan of care.    Review of external notes including PT notes, PCP notes and specialist notes was performed at this visit in addition to review of new ordered imaging and past imaging to develop or modify multidisciplinary  pain plan    No orders of the defined types were placed in this encounter.      History of Present Illness    Greater than 70% relief of pain with improved ability to participate with IADLs after C6-C7 ILESI performed 5/22/24; pain has returned after more than 2 months of relief. Gabapentin at current dosing modestly improving pain, participation with IADLs without side effects. PT significantly improving pain. Previously reported the following symptomatology:     Caitie Watson is a 73 y.o. female with pmhx of anxiety, XOL, hypothyroidism, GERD presenting with left, greater than right sided cervical radicular pain in C6 and C7 dermatomal distribution accompanied by pain limited weakness numbness and paresthesias.  Patient has not yet been been a full participant with PT. Chronic pain with decreased participation with IADLs over the past few months  Has been taking NSAIDs and tylenol infrequently with modest benefit.  Steroids orally helped significantly. The pain contributes to significant disability in participation with independent activities of daily living and is accompanied by weakness numbness and paresthesias that are debilitating in nature.  The patient describes that overhead maneuvers such as combing hair is significantly limiting with respect to strength in the left vs right arm/hand. The patient notes significant weakness with bilateral hand  as well. The patient has not been to physical therapy yet. She has trialed conservative measures including nsaids, tylenol for the pain but has had the most benefit with steroids.  She has never had interventional pain procedures in the past including any cervical interlaminar epidural steroid injections in the past for this pain. Denies any gait abnormality, saddle anesthesia or bowel/bladder abnormality.    I have personally reviewed and/or updated the patient's past medical history, past surgical history, family history, social history, current medications,  allergies, and vital signs today.     Review of Systems   Constitutional:  Positive for activity change.   HENT: Negative.     Eyes: Negative.    Respiratory: Negative.     Cardiovascular: Negative.    Gastrointestinal: Negative.    Endocrine: Negative.    Genitourinary: Negative.    Musculoskeletal:  Positive for arthralgias, myalgias, neck pain and neck stiffness. Negative for back pain and gait problem.   Skin: Negative.    Allergic/Immunologic: Negative.    Neurological:  Positive for weakness and numbness.   Hematological: Negative.    Psychiatric/Behavioral: Negative.     All other systems reviewed and are negative.      Patient Active Problem List   Diagnosis    Cervical radiculopathy    Cervical spondylosis       Past Medical History:   Diagnosis Date    Breast cancer (HCC)     left breast had 18 radiation treatments    Cancer (HCC)     GERD (gastroesophageal reflux disease)     Hyperlipidemia        Past Surgical History:   Procedure Laterality Date    BREAST LUMPECTOMY Left 01/2022    HYSTERECTOMY  1996    IR SPINE AND PAIN PROCEDURE  5/22/2024    LAPAROSCOPIC CHOLECYSTECTOMY  02/01/2023       No family history on file.    Social History     Occupational History    Not on file   Tobacco Use    Smoking status: Never    Smokeless tobacco: Never   Vaping Use    Vaping status: Never Used   Substance and Sexual Activity    Alcohol use: Not Currently    Drug use: Never    Sexual activity: Not on file       Current Outpatient Medications on File Prior to Encounter   Medication Sig    acetaminophen (TYLENOL) 325 mg tablet Take 650 mg by mouth every 6 (six) hours as needed for mild pain    Calcium Carbonate-Vit D-Min (Caltrate 600+D Plus Minerals) 600-800 MG-UNIT TABS     dexlansoprazole (DEXILANT) 60 MG capsule Take 60 mg by mouth daily    gabapentin (Neurontin) 600 MG tablet Take 1 tablet (600 mg total) by mouth 3 (three) times a day    levothyroxine (Synthroid) 25 mcg/mL SUSP     pravastatin (PRAVACHOL) 80 mg  "tablet Take 1 tablet by mouth every evening     No current facility-administered medications on file prior to encounter.       No Known Allergies      Physical Exam    /66   Pulse 60   Temp 97.7 °F (36.5 °C) (Temporal)   Resp 16   Ht 5' 6\" (1.676 m)   Wt 72.6 kg (160 lb)   SpO2 99%   BMI 25.82 kg/m²     Constitutional: normal, well developed, well nourished, alert, in no distress and non-toxic and no overt pain behavior.  Eyes: anicteric  HEENT: grossly intact  Neck: supple, symmetric, trachea midline and no masses   Pulmonary:even and unlabored  Cardiovascular:No edema or pitting edema present  Skin:Normal without rashes or lesions and well hydrated  Psychiatric:Mood and affect appropriate  Neurologic:Cranial Nerves II-XII grossly intact Sensation grossly intact; no clonus negative smith's. Reflexes 2+ and brisk.  Musculoskeletal:normal gait. 5/5 strength bilaterally with AROM in upper extremities. Significant pain with cervical facet loading bilaterally and with lateral spine rotation, ttp over cervical paraspinal muscles, left greater than right.     Imaging    MRI cervical spine wo contrast  Order: [id number]  Impression    IMPRESSION:    1.  Multilevel disc degeneration and cervical spondylosis, greatest at C3-4.  2.  At C3-4, disc ridge complex with uncovertebral arthropathy. There is mild  impression of the ventral thecal sac with moderate bilateral neural foraminal  stenosis.            Workstation:[id number]  Narrative      Examination: MRI cervical spine    INDICATION: Chronic neck pain    TECHNIQUE: Multiplanar multi sequential imaging performed of the cervical spine,  without contrast    FINDINGS: There is straightening of the cervical lordosis. Vertebral bodies are  preserved in stature. No pathologic edema noted within the marrow or within the  ligaments. The facet joints are aligned normally. There is anterior endplate  spurring at C4, C4-5, C5-6, C6-7.    Evaluation of individual disc " levels demonstrate the following:    C2-3, no evidence of disc herniation, central canal, neural foraminal stenosis.    C3-4, disc ridge complex uncovertebral arthropathy. Is mild impression of the  ventral thecal sac with moderate bilateral neural foraminal stenosis.    C4-5, mild degenerative disc bulging. Is mild impression of the ventral thecal  sac with mild bilateral neural foraminal stenosis.    C5-6, disc ridge complex uncovertebral arthropathy. Mild impression of the  ventral thecal sac with mild bilateral neural foraminal narrowing.    C6-7, degenerative disc bulging. Mild impression of the ventral thecal sac with  mild bilateral neural foraminal narrowing.    C7-T1, normal.

## 2024-08-13 NOTE — DISCHARGE INSTR - AVS FIRST PAGE
YOUR 2 WEEK FOLLOW UP HAS BEEN SCHEDULED; IF YOU WISH TO CHANGE THE FOLLOW UP, PLEASE CALL THE [facility] [place]: [phone]    Epidural Steroid Injection   WHAT YOU NEED TO KNOW:   An epidural steroid injection (RICARDO) is a procedure to inject steroid medicine into the epidural space. The epidural space is between your spinal cord and vertebrae. Steroids reduce inflammation and fluid buildup in your spine that may be causing pain. You may be given pain medicine along with the steroids.        DISCHARGE INSTRUCTIONS:   Call your local emergency number (911 in the [place]) if:   You have a seizure.    You have trouble moving your legs.    Seek care immediately if:   Blood soaks through your bandage.    You have a fever or chills, severe back pain, and the procedure area is sensitive to the touch.    You cannot control when you urinate or have a bowel movement.    Call your doctor if:   You have weakness or numbness in your legs.    Your wound is red, swollen, or draining pus.    You have nausea or are vomiting.    Your face or neck is red and you feel warm.    You have more pain than you had before the procedure.    You have swelling in your hands or feet.    You have questions or concerns about your condition or care.    Care for your wound as directed:  You may remove the bandage before you go to bed the day of your procedure. You may take a shower, but do not take a bath for at least 24 hours.   Self-care:   Do not drive,  use machines, or do strenuous activity for 24 hours after your procedure or as directed.     Continue other treatments  as directed. Steroid injections alone will not control your pain. The injections are meant to be used with other treatments, such as physical therapy.    Follow up with your doctor as directed:  Write down your questions so you remember to ask them during your visits.     EPIDURAL STEROID INJECTION DISCHARGE INSTRUCTIONS      ACTIVITY  Do not drive or operate  machinery today.  No strenuous activity today - bending, lifting, etc.   You may resume normal activities starting tomorrow - start slowly and as tolerated.  You may shower today, but not tub baths or hot tubs.  You may have numbness for several hours from the local anesthetics. Please use caution and common sense, especially with weight-bearing activities.    CARE OF THE INJECTION SITE  If you have soreness or pain apply ice to the area today (20 minutes on and 20 minutes off).  Starting tomorrow, you   Notify the [facility] if you have any of the following: redness, drainage, swelling or fever above 100°F.      MEDICATIONS  Continue to take all routine medications.  Our office may have instructed you to hold some medications. You may restart medications, including blood thinners.

## 2024-08-14 ENCOUNTER — TELEPHONE (OUTPATIENT)
Age: 74
End: 2024-08-14

## 2024-08-14 NOTE — TELEPHONE ENCOUNTER
Caller: bing Blood    Doctor: Liv    Reason for call: pt called in stating she had a procedure yesterday and her cheeks are clovis.  Pt states she feels good    Call back#: [phone]

## 2024-08-22 RX ORDER — LEVOTHYROXINE SODIUM 125 UG/1
TABLET ORAL
COMMUNITY
Start: 2024-06-25

## 2024-08-26 ENCOUNTER — OFFICE VISIT (OUTPATIENT)
Dept: PAIN MEDICINE | Facility: CLINIC | Age: 74
End: 2024-08-26
Payer: MEDICARE

## 2024-08-26 VITALS
OXYGEN SATURATION: 97 % | BODY MASS INDEX: 25.71 KG/M2 | RESPIRATION RATE: 16 BRPM | HEART RATE: 71 BPM | DIASTOLIC BLOOD PRESSURE: 68 MMHG | HEIGHT: 66 IN | SYSTOLIC BLOOD PRESSURE: 104 MMHG | WEIGHT: 160 LBS | TEMPERATURE: 97.7 F

## 2024-08-26 DIAGNOSIS — M47.22 OTHER SPONDYLOSIS WITH RADICULOPATHY, CERVICAL REGION: Primary | ICD-10-CM

## 2024-08-26 PROCEDURE — 99213 OFFICE O/P EST LOW 20 MIN: CPT | Performed by: ANESTHESIOLOGY

## 2024-08-26 PROCEDURE — G2211 COMPLEX E/M VISIT ADD ON: HCPCS | Performed by: ANESTHESIOLOGY

## 2024-08-26 NOTE — PROGRESS NOTES
Assessment  1. Other spondylosis with radiculopathy, cervical region  -     Ambulatory referral to Physical Therapy; Future    Greater than 80% relief of pain with improved ability to participate with IADLs after C6-C7 ILESI. Gabapentin at current dosing significantly improving pain, participation with IADLs without side effects. PT significantly improving pain. Previously reported the following symptomatology:     Left, greater than right sided cervical radicular pain in C6 and C7 dermatomal distribution accompanied by pain limited weakness numbness and paresthesias.  Patient has not yet been a full participant with PT. Chronic pain with decreased participation with IADLs over the past 3 months.  Has been taking NSAIDs and tylenol infrequently with modest benefit.  5/5 strength bilaterally in upper extremities with AROM, posittive spurling's maneuver,b/l.  Additionally there is positive cervical facet loading eliciting pain, left greater than right. Negative Davila's, denies any gait instability, saddle anesthesia. On MRI imaging Multilevel disc degeneration and cervical spondylosis, greatest at C3-4.  At C3-4, disc ridge complex with uncovertebral arthropathy. There is mildimpression of the ventral thecal sac with moderate bilateral neural foraminal stenosis.  No cord compression or cord signal abnormality.  Risks, benefits alternatives to epidural steroid injections thoroughly discussed with patient.  Handouts provided questions answered to patient's satisfaction.  Lifestyle modifications extensively discussed including sleep hygiene, neck posture, diet, exercise and weight loss in conjunction with PT.  Will proceed with multimodal pain therapy plan as noted below:    Plan  -f/u prn  -gabapentin increased to 600 mg t.i.d. Ordered for patient; counseled regarding sedative effects of taking this medication and provided up titration calendar.  Counseled not to take medication while driving or operating heavy  machinery/using stairs  -script provided for formal physical therapy for right-sided lumbar radiculopathy; Physician directed home exercise plan as per [employer] demonstrated and handouts provided that patient plans to participate with for 1 hour, twice a week for the next 6 weeks.     There are risks associated with opioid medications, including dependence, addiction and tolerance. The patient understands and agrees to use these medications only as prescribed. Potential side effects of the medications include, but are not limited to, constipation, drowsiness, addiction, impaired judgment and risk of fatal overdose if not taken as prescribed. The patient was warned against driving while taking sedation medications or operating heavy machinery. The patient voiced understanding. Sharing medications is a felony. At this point in time, the patient is showing no signs of addiction, abuse, diversion or suicidal ideation.     [place] Prescription Drug Monitoring Program report was reviewed and was appropriate      Complete risks and benefits including bleeding, infection, tissue reaction, nerve injury and allergic reaction were discussed. The approach was demonstrated using models and literature was provided. Verbal and written consent was obtained.     My impressions and treatment recommendations were discussed in detail with the patient who verbalized understanding and had no further questions.  Discharge instructions were provided. I personally saw and examined the patient and I agree with the above discussed plan of care.    Review of external notes including PT notes, PCP notes and specialist notes was performed at this visit in addition to review of new ordered imaging and past imaging to develop or modify multidisciplinary pain plan    New Medications Ordered This Visit   Medications    levothyroxine 125 mcg tablet       History of Present Illness    Greater than 80% relief of pain with improved ability to  participate with IADLs after C6-C7 ILESI. Gabapentin at current dosing significantly improving pain, participation with IADLs without side effects. PT significantly improving pain. Previously reported the following symptomatology:     Caitie Watson is a 73 y.o. female with pmhx of anxiety, XOL, hypothyroidism, GERD presenting with left, greater than right sided cervical radicular pain in C6 and C7 dermatomal distribution accompanied by pain limited weakness numbness and paresthesias.  Patient has not yet been been a full participant with PT. Chronic pain with decreased participation with IADLs over the past few months  Has been taking NSAIDs and tylenol infrequently with modest benefit.  Steroids orally helped significantly. The pain contributes to significant disability in participation with independent activities of daily living and is accompanied by weakness numbness and paresthesias that are debilitating in nature.  The patient describes that overhead maneuvers such as combing hair is significantly limiting with respect to strength in the left vs right arm/hand. The patient notes significant weakness with bilateral hand  as well. The patient has not been to physical therapy yet. She has trialed conservative measures including nsaids, tylenol for the pain but has had the most benefit with steroids.  She has never had interventional pain procedures in the past including any cervical interlaminar epidural steroid injections in the past for this pain. Denies any gait abnormality, saddle anesthesia or bowel/bladder abnormality.    I have personally reviewed and/or updated the patient's past medical history, past surgical history, family history, social history, current medications, allergies, and vital signs today.     Review of Systems   Constitutional:  Positive for activity change.   HENT: Negative.     Eyes: Negative.    Respiratory: Negative.     Cardiovascular: Negative.    Gastrointestinal: Negative.     Endocrine: Negative.    Genitourinary: Negative.    Musculoskeletal:  Positive for arthralgias, myalgias, neck pain and neck stiffness. Negative for back pain and gait problem.   Skin: Negative.    Allergic/Immunologic: Negative.    Neurological:  Positive for weakness and numbness.   Hematological: Negative.    Psychiatric/Behavioral: Negative.     All other systems reviewed and are negative.      Patient Active Problem List   Diagnosis    Cervical radiculopathy    Cervical spondylosis       Past Medical History:   Diagnosis Date    Breast cancer (HCC)     left breast had 18 radiation treatments    Cancer (HCC)     GERD (gastroesophageal reflux disease)     Hyperlipidemia        Past Surgical History:   Procedure Laterality Date    BREAST LUMPECTOMY Left 01/2022    HYSTERECTOMY  1996    IR SPINE AND PAIN PROCEDURE  5/22/2024    IR SPINE AND PAIN PROCEDURE  8/13/2024    LAPAROSCOPIC CHOLECYSTECTOMY  02/01/2023       History reviewed. No pertinent family history.    Social History     Occupational History    Not on file   Tobacco Use    Smoking status: Never    Smokeless tobacco: Never   Vaping Use    Vaping status: Never Used   Substance and Sexual Activity    Alcohol use: Not Currently    Drug use: Never    Sexual activity: Not on file       Current Outpatient Medications on File Prior to Visit   Medication Sig    Calcium Carbonate-Vit D-Min (Caltrate 600+D Plus Minerals) 600-800 MG-UNIT TABS     dexlansoprazole (DEXILANT) 60 MG capsule Take 60 mg by mouth daily    gabapentin (Neurontin) 600 MG tablet Take 1 tablet (600 mg total) by mouth 3 (three) times a day    levothyroxine (Synthroid) 25 mcg/mL SUSP     levothyroxine 125 mcg tablet     pravastatin (PRAVACHOL) 80 mg tablet Take 1 tablet by mouth every evening     No current facility-administered medications on file prior to visit.       No Known Allergies      Physical Exam    /68 (BP Location: Right arm, Patient Position: Sitting, Cuff Size: Adult)    "Pulse 71   Temp 97.7 °F (36.5 °C)   Resp 16   Ht 5' 6\" (1.676 m)   Wt 72.6 kg (160 lb)   SpO2 97%   BMI 25.82 kg/m²     Constitutional: normal, well developed, well nourished, alert, in no distress and non-toxic and no overt pain behavior.  Eyes: anicteric  HEENT: grossly intact  Neck: supple, symmetric, trachea midline and no masses   Pulmonary:even and unlabored  Cardiovascular:No edema or pitting edema present  Skin:Normal without rashes or lesions and well hydrated  Psychiatric:Mood and affect appropriate  Neurologic:Cranial Nerves II-XII grossly intact Sensation grossly intact; no clonus negative smith's. Reflexes 2+ and brisk.  Musculoskeletal:normal gait. 5/5 strength bilaterally with AROM in upper extremities. Significant pain with cervical facet loading bilaterally and with lateral spine rotation, ttp over cervical paraspinal muscles, left greater than right.     Imaging    MRI cervical spine wo contrast  Order: [id number]  Impression    IMPRESSION:    1.  Multilevel disc degeneration and cervical spondylosis, greatest at C3-4.  2.  At C3-4, disc ridge complex with uncovertebral arthropathy. There is mild  impression of the ventral thecal sac with moderate bilateral neural foraminal  stenosis.            Workstation:[id number]  Narrative      Examination: MRI cervical spine    INDICATION: Chronic neck pain    TECHNIQUE: Multiplanar multi sequential imaging performed of the cervical spine,  without contrast    FINDINGS: There is straightening of the cervical lordosis. Vertebral bodies are  preserved in stature. No pathologic edema noted within the marrow or within the  ligaments. The facet joints are aligned normally. There is anterior endplate  spurring at C4, C4-5, C5-6, C6-7.    Evaluation of individual disc levels demonstrate the following:    C2-3, no evidence of disc herniation, central canal, neural foraminal stenosis.    C3-4, disc ridge complex uncovertebral arthropathy. Is mild impression of " the  ventral thecal sac with moderate bilateral neural foraminal stenosis.    C4-5, mild degenerative disc bulging. Is mild impression of the ventral thecal  sac with mild bilateral neural foraminal stenosis.    C5-6, disc ridge complex uncovertebral arthropathy. Mild impression of the  ventral thecal sac with mild bilateral neural foraminal narrowing.    C6-7, degenerative disc bulging. Mild impression of the ventral thecal sac with  mild bilateral neural foraminal narrowing.    C7-T1, normal.

## 2024-09-30 DIAGNOSIS — M47.22 OTHER SPONDYLOSIS WITH RADICULOPATHY, CERVICAL REGION: Primary | ICD-10-CM

## 2024-12-05 ENCOUNTER — TELEPHONE (OUTPATIENT)
Dept: PAIN MEDICINE | Facility: CLINIC | Age: 74
End: 2024-12-05